# Patient Record
Sex: FEMALE | Race: WHITE
[De-identification: names, ages, dates, MRNs, and addresses within clinical notes are randomized per-mention and may not be internally consistent; named-entity substitution may affect disease eponyms.]

---

## 2021-04-01 ENCOUNTER — HOSPITAL ENCOUNTER (INPATIENT)
Dept: HOSPITAL 41 - JD.OB | Age: 31
LOS: 2 days | Discharge: HOME | DRG: 560 | End: 2021-04-03
Attending: OBSTETRICS & GYNECOLOGY | Admitting: OBSTETRICS & GYNECOLOGY
Payer: COMMERCIAL

## 2021-04-01 DIAGNOSIS — D62: ICD-10-CM

## 2021-04-01 DIAGNOSIS — Z20.822: ICD-10-CM

## 2021-04-01 DIAGNOSIS — Z3A.39: ICD-10-CM

## 2021-04-01 DIAGNOSIS — N87.1: ICD-10-CM

## 2021-04-01 PROCEDURE — 00HU33Z INSERTION OF INFUSION DEVICE INTO SPINAL CANAL, PERCUTANEOUS APPROACH: ICD-10-PCS | Performed by: OBSTETRICS & GYNECOLOGY

## 2021-04-01 PROCEDURE — 3E0R3BZ INTRODUCTION OF ANESTHETIC AGENT INTO SPINAL CANAL, PERCUTANEOUS APPROACH: ICD-10-PCS | Performed by: OBSTETRICS & GYNECOLOGY

## 2021-04-01 PROCEDURE — 10907ZC DRAINAGE OF AMNIOTIC FLUID, THERAPEUTIC FROM PRODUCTS OF CONCEPTION, VIA NATURAL OR ARTIFICIAL OPENING: ICD-10-PCS | Performed by: OBSTETRICS & GYNECOLOGY

## 2021-04-01 PROCEDURE — U0002 COVID-19 LAB TEST NON-CDC: HCPCS

## 2021-04-01 RX ADMIN — EPHEDRINE SULFATE PRN MG: 50 INJECTION, SOLUTION INTRAVENOUS at 13:03

## 2021-04-01 RX ADMIN — EPHEDRINE SULFATE PRN MG: 50 INJECTION, SOLUTION INTRAVENOUS at 12:47

## 2021-04-01 NOTE — PCM.LDHP
L&D History of Present Illness





- General


Date of Service: 21


Admit Problem/Dx: 


                   Patient Status Order with Admit Dx/Problem





21 08:55


Patient Status [ADT] Routine 








                           Admission Diagnosis/Problem











Admission Diagnosis/Problem    39 weeks gestation of pregnancy














Source of Information: Patient


History Limitations: Reports: No Limitations





- History of Present Illness


Introduction:: 





Arlene Salinas is a 30-year-old -0-3-4 female at 39 weeks 2 days (MARISEL 

2021) by an 11-week ultrasound who presents for an elective induction of 

labor.  She reports that she has been having irregular contractions since her 

last visit but nothing that has been very painful or strong.  She denies any 

leaking of fluid or vaginal bleeding.  Reports good fetal movement.


Timing/Duration: Reports: intermittent


Location, Pregnancy: Reports: Pelvic


Quality: Reports: Pressure


Severity: Mild


Improves with: Reports: None


Worsens with: Reports: None


Associated Symptoms: Denies: vaginal bleeding, vaginal discharge, vaginal fluid


Present Illness Comments:: 





Arlene Salinas is a 30-year-old -0-3-4 female at 39 weeks 2 days (MARISEL 

2021) by an 11-week ultrasound who presents for an elective induction of 

labor.  She has had overall routine care throughout the pregnancy with myself, 

Dr. Huitron, and with Dr. Harrison.  Her pregnancy has been complicated by abnormal Pap

 smear and positive chlamydia infection.  The chlamydia infection was diagnosed 

on 9/15/2020 and she was treated with azithromycin on 2020.  She had a 

negative test of cure at 36 weeks gestational age.  She had colposcopy done for 

HGSIL Pap smear that showed VIJAY-2-3 as well as VIJAY-1.  ECC was not performed.  

Patient is planning to have LEEP after delivery.  She had repeat colposcopy 

without biopsies in late pregnancy that did not show any progression of the 

cervical abnormalities.  She had an elevated 1 hour glucose tolerance test with 

a value of 137 but did not get a 3-hour glucose tolerance test done.  She does 

have history of Graves' disease but her thyroid levels have been normal 

throughout the pregnancy.  Her GBS swab was negative.  Her fetal anatomy ultra

sound was normal and did not show any abnormalities.





Her pregnancy is complicated by:


* VIJAY-2-3 on biopsy of her cervix that was done on colposcopy on 10/19/2020.  

  She had repeat colposcopy towards the end of the pregnancy that did not show 

  any significant changes in the cervix.  Plan for LEEP procedure after delivery


* Chlamydia infection during pregnancy with treatment.  She had negative test of

   cure on 10/19/2020 with negative test at 36 weeks gestational age.


* Elevated 1 hour glucose tolerance test at 33 weeks gestational age with a 

  value of 137.  She did not have 3-hour glucose tolerance test done.


* History of MRSA staph infection in  with negative MRSA culture prior to 

  delivery.


* History of Graves' disease with normal thyroid levels during pregnancy








OB/GYN history


-0-3-4


G1: 3/11/2009, 38 weeks, , female infant, 7 pounds 3 ounces, epidural for 

anesthesia, no complications


G2: 2010, 4 weeks, miscarriage


G3: 2012, 38 weeks, , male infant, 7 pounds 5 ounces, epidural for 

anesthesia, uterine atony after delivery and treated with medications


G4: 2013, 11 weeks, miscarriage


G5: 11/3/2014, 39 weeks, , female infant, 6 pounds 7 ounces, epidural for 

anesthesia, contractions starting at 6 months


G6: 10/22/2015, 5 weeks, miscarriage


G7: 10/11/2016, 39 weeks 1 day, , female infant, 6 pounds 14 ounces, 

epidural for anesthesia, no complications


G8: Current pregnancy








Prenatal labs


Blood type: O+


Antibody screen: Negative


First trimester hematocrit/hemoglobin: 40.5%/13.4 on 9/15/2020


Platelets: 284 on 9/15/2020


Urine culture: Mixed morro suggestive of contamination


Rubella status: Immune


Hepatitis B surface antigen: Negative


RPR: Negative


HIV: Negative


Gonorrhea: Negative


Chlamydia: Positive, treated with azithromycin


Anatomy ultrasound: Normal anatomy, anterior placenta


Repeat gonorrhea: Negative on 10/19/2020


Repeat Chlamydia: Negative on 10/19/2020


One hour glucose tolerance test: 137 at 33 weeks gestational age


Third trimester hematocrit/hemoglobin: 35.3%/11.4 on 2021


Platelets: 320 on 2021


GBS status: Negative





- Related Data


Allergies/Adverse Reactions: 


                                    Allergies











Allergy/AdvReac Type Severity Reaction Status Date / Time


 


lidocaine Allergy  Rash Verified 21 09:06


 


penicillin Allergy  Hives Verified 21 09:06











Home Medications: 


                                    Home Meds





Propranolol [Inderal] 20 mg PO TID PRN 10/01/15 [History]


Acetaminophen [Tylenol] 650 mg PO Q6H PRN #0 tablet 10/13/16 [Rx]


Benzocaine/Menthol [Dermoplast Pain Relief Spray] 1 spray TOP ASDIRECTED PRN #0 

canister 10/13/16 [Rx]


Docusate Sodium [Colace] 100 mg PO BID PRN #0 cap 10/13/16 [Rx]


Ibuprofen [IJD: Ibuprofen] 200 - 600 mg PO Q6H PRN #0 tablet 10/13/16 [Rx]


witch hazeL [Tucks] 1 pad TOP ASDIRECTED PRN #0 pad 10/13/16 [Rx]











Past Medical History


Cardiovascular History: Reports: Other (See Below)


Other Cardiovascular History: Tachycardia at times


OB/GYN History: Reports: Pregnancy, Spontaneous , Other (See Below)


: 8


Para: 4


Other OB/BYN History: D&C 


Endocrine/Metabolic History: Reports: Other (See Below)


Other Endocrine/Metabolic History: Graves Disease


Other Oncologic History: cancerous mole removed





- Past Surgical History


Cardiovascular Surgical History: Reports: None


GI Surgical History: Reports: None


Female  Surgical History: Reports: None


Dermatological Surgical History: Reports: Other (See Below)





Social & Family History





- Family History


Family Medical History: No Pertinent Family History





- Tobacco Use


Tobacco Use Status *Q: Never Tobacco User


Tobacco Use Within Last Twelve Months: No





- Tobacco Core Measures


Tobacco Use/Smoking Within Last 30 Days: No


Smokeless Tobacco Use in Last 30 Days: No





- Caffeine Use


Caffeine Use: Reports: None





- Alcohol Use


Alcohol Use History: No





- Recreational Drug Use


Recreational Drug Use: No


Drug Use in Last 12 Months: No





- Living Situation & Occupation


Living situation: Reports: , with Spouse, with Family





H&P Review of Systems





- Review of Systems:


Review Of Systems: See Below


General: Denies: Fever, Chills, Malaise, Fatigue


HEENT: Denies: Headaches, Rhinitis, Post Nasal Drip, Sinus Congestion, Sore 

Throat, Visual Changes


Pulmonary: Denies: Shortness of Breath, Wheezing, Pleuritic Chest Pain


Cardiovascular: Denies: Chest Pain, Palpitations, Dyspnea on Exertion


Gastrointestinal: Reports: Diarrhea (one episode earlier this week).  Denies: 

Abdominal Pain, Constipation, Nausea, Vomiting


Genitourinary: Denies: Dysuria, Frequency, Burning, Pain, Urgency


Musculoskeletal: Reports: Back Pain (and hip pain of pregnancy)


Skin: Denies: Rash, Lesions


Psychiatric: Denies: Depression, Anxiety


Neurological: Denies: Dizziness, Headache





L&D Exam





- Exam


Exam: See Below





- Vital Signs


Weight: 84.368 kg





- OB Specific


Contraction Duration (sec): 30-45


Contraction Frequency (min): 3-4


Contraction Intensity: Mild


Fetal Movement: Active


Fetal Heart Tones: Present


Fetal Heart Tones per Min: 125 (+15 x 15 accelerations, no decelerations)


Fetal Heart Rate (FHR) Variability: Moderate (6-25 bmp)


Birth Presentation: Vertex


Estimated Fetal Weight: 7-7.5 pounds by Leopold





- Fleming Score


Fleming Score Cervix Position: Midposition


Fleming Score Consistency: Soft


Fleming Score Effacement: 31-50% (40)


Fleming Score Dilation: 1-2 cm (1 cm)


Fleming Score Infant's Station: -3 (-4)


Fleming Score Total: 5





- Exam


General: Alert, Oriented


HEENT: Conjunctiva Clear, EOMI


Neck: Supple, Trachea Midline


Lungs: Clear to Auscultation, Normal Respiratory Effort


Cardiovascular: Regular Rate, Regular Rhythm


GI/Abdominal Exam: Soft, Non-Tender, No Distention, Other (Gravid).  No: 

Guarding, Rigid, Rebound


Genitourinary: Normal external exam, Cervical dilitation (1 cm)


Extremities: Normal Inspection, Pedal Edema (1+)


Skin: Warm, Dry, Intact


Psychiatric: Alert, Normal Affect, Normal Mood





- Problem List


(1) Chlamydia infection affecting pregnancy in first trimester


SNOMED Code(s): 72380348, 399995726


   ICD Code: O98.811 - OTH MATERNAL INFEC/PARASTC DISEASES COMP PREG, FIRST TRI;

 A74.9 - CHLAMYDIAL INFECTION, UNSPECIFIED   Status: Acute   Current Visit: Yes 

  





(2) VIJAY II (cervical intraepithelial neoplasia II)


SNOMED Code(s): 995897127


   ICD Code: N87.1 - MODERATE CERVICAL DYSPLASIA   Status: Acute   Current 

Visit: Yes   





(3) VIJAY III (cervical intraepithelial neoplasia III)


Status: Acute   Current Visit: Yes   





(4) Abnormal glucose tolerance test (GTT) during pregnancy, antepartum


Status: Acute   Current Visit: Yes   





(5) History of MRSA infection


SNOMED Code(s): 776757362, [id number]


   ICD Code: Z86.14 - PERSONAL HISTORY OF METHICILLIN RESIS STAPH INFECTION   

Status: Acute   Current Visit: Yes   





(6) 39 weeks gestation of pregnancy


SNOMED Code(s): 70750492


   ICD Code: Z3A.39 - 39 WEEKS GESTATION OF PREGNANCY   Status: Acute   Current 

Visit: No   


Problem List Initiated/Reviewed/Updated: Yes


Orders Last 24hrs: 


                               Active Orders 24 hr











 Category Date Time Status


 


 Patient Status [ADT] Routine ADT  21 08:55 Ordered


 


 Activity as Tolerated [RC] PFP Care  21 08:54 Ordered


 


 Communication Order [RC] ASDIRECTED Care  21 08:54 Ordered


 


 Fetal Heart Tones [RC] ASDIRECTED Care  21 08:55 Ordered


 


 Fetal Non Stress Test [RC] PER UNIT ROUTINE Care  21 08:54 Ordered


 


 Notify Provider Vital Signs [RC] PRN Care  21 08:56 Ordered


 


 Notify Provider [RC] PFP Care  21 08:54 Ordered


 


 Notify Provider [RC] PRN Care  21 08:54 Ordered


 


 Peripheral IV Care [RC] .AS DIRECTED Care  21 08:55 Ordered


 


 Pump Management, Intrathecal [RC] ASDIRECTED Care  21 08:56 Ordered


 


 Urinary Catheter Assessment [RC] ASDIRECTED Care  21 08:54 Ordered


 


 Vital Signs [RC] PER UNIT ROUTINE Care  21 08:54 Ordered


 


 Regular Diet [DIET] Diet  21 Breakfast Ordered


 


 CBC WITH AUTO DIFF [HEME] Routine Lab  21 08:54 Ordered


 


 CORONAVIRUS COVID-19 CHELO [MOLEC] Stat Lab  21 07:21 Received


 


 RAPID PLASMA REAGIN,RPR [CHEM] Routine Lab  21 08:54 Ordered


 


 TYPE AND SCREEN [BBK] Routine Lab  21 08:59 Ordered


 


 Lactated Ringers [Ringers, Lactated] 1,000 ml Med  21 09:00 Ordered





 IV ASDIRECTED   


 


 Nalbuphine [Nubain] Med  21 08:54 Ordered





 10 mg IVPUSH Q2H PRN   


 


 Oxytocin/Lactated Ringers [Pitocin in LR 10 Units/1,000 Med  21 09:00 

Ordered





 ML]   





 10 unit in 1,000 ml IV TITRATE   


 


 Oxytocin/Lactated Ringers [Pitocin in LR 20 Units/1,000 Med  21 09:00 

Ordered





 ML]   





 20 unit in 1,000 ml IV .CONTINUOUS   


 


 Sodium Chloride 0.9% [Saline Flush] Med  21 08:54 Ordered





 10 ml FLUSH ASDIRECTED PRN   


 


 miSOPROStoL [Cytotec] Med  21 08:57 Once





 1,000 mcg RECTAL ONETIME ONE   


 


 Electronic Fetal Heart Tones Ext w TOCO [WOMSER] Ot  21 08:54 Ordered





 Routine   


 


 Electronic Fetal Heart Tones Internal [WOMSER] Per Unit Ot  21 08:54 

Ordered





 Routine   


 


 Peripheral IV Insertion Adult [OM.PC] Routine Ot  21 08:54 Ordered


 


 Resuscitation Status Routine Resus Stat  21 08:54 Ordered








                                Medication Orders





Lactated Ringer's (Ringers, Lactated)  1,000 mls @ 100 mls/hr IV ASDIRECTED IBRAHIMA


Oxytocin/Lactated Ringer's (Pitocin In Lr 10 Units/1,000 Ml)  10 unit in 1,000 

mls @ 12 mls/hr IV TITRATE IBRAHIMA; Protocol


Oxytocin/Lactated Ringer's (Pitocin In Lr 20 Units/1,000 Ml)  20 unit in 1,000 

mls @ 100 mls/hr IV .CONTINUOUS IBRAHIMA


Misoprostol (Misoprostol 200 Mcg Tab)  1,000 mcg RECTAL ONETIME ONE


   Stop: 21 08:58


Nalbuphine HCl (Nalbuphine 10 Mg/1 Ml Vial)  10 mg IVPUSH Q2H PRN


   PRN Reason: Pain


Sodium Chloride (Sodium Chloride 0.9% 10 Ml Syringe)  10 ml FLUSH ASDIRECTED PRN


   PRN Reason: Keep Vein Open








Assessment/Plan Comment:: 





Arlene Salinas is a 30-year-old -0-3-4 female at 39 weeks 2 days (MARISEL 

2021) by an 11-week ultrasound who presents for an elective induction of 

labor.





Refer to observation for elective induction of labor


Start Pitocin for induction of labor


Continuous monitoring


Place IV and have Lactated Ringer's at 125 ml/hr 


May have small amounts of regular diet


Activity as tolerated


May have epidural as desired


Plans to breast-feed after delivery


Plan for artificial rupture membranes once cervix is more dilated


Anticipate vaginal delivery unless otherwise indicated





Juarez Huitron MD


10:52 AM


2021

## 2021-04-01 NOTE — PCM.PNLD
<Gwendolyn Luna - Last Filed: 04/01/21 14:28>





Labor Progress Note





- VS & Meds


Vital Signs: 


                                Last Vital Signs











Temp  99.2 F   04/01/21 08:54


 


Pulse  91   04/01/21 08:54


 


Resp  16   04/01/21 08:54


 


BP  118/78   04/01/21 08:54


 


Pulse Ox      











Active Medications: 


                               Current Medications





Diphenhydramine HCl (Diphenhydramine 50 Mg/Ml Sdv)  25 mg IVPUSH Q6H PRN


   PRN Reason: pruritis


Ephedrine Sulfate (Ephedrine 50 Mg/Ml Sdv)  5 mg IVPUSH ASDIRECTED PRN


   PRN Reason: Hypotension


   Last Admin: 04/01/21 13:03 Dose:  5 mg


   Documented by: 


Fentanyl (Fentanyl 100 Mcg/2 Ml Sdv)  100 mcg EPIDUR Q3H PRN


   PRN Reason: Pain


   Last Admin: 04/01/21 11:49 Dose:  100 mcg


   Documented by: 


Fentanyl/Bupivacaine HCl (Bupivacaine/Fentanyl/Ns 100 Ml Bag)  100 ml EPIDUR 

ASDIRECTED PRN


   PRN Reason: Pain


   Last Admin: 04/01/21 12:14 Dose:  100 ml


   Documented by: 


Lactated Ringer's (Ringers, Lactated)  1,000 mls @ 100 mls/hr IV ASDIRECTED IBRAHIMA


   Last Admin: 04/01/21 12:19 Dose:  100 mls/hr


   Documented by: 


Oxytocin/Lactated Ringer's (Pitocin In Lr 10 Units/1,000 Ml)  10 unit in 1,000 m

ls @ 12 mls/hr IV TITRATE IBRAHIMA; Protocol


   Last Titration: 04/01/21 14:01 Dose:  16 munits/min, 96 mls/hr


   Documented by: 


Oxytocin/Lactated Ringer's (Pitocin In Lr 20 Units/1,000 Ml)  20 unit in 1,000 

mls @ 100 mls/hr IV .CONTINUOUS IBRAHIMA


Nalbuphine HCl (Nalbuphine 10 Mg/1 Ml Vial)  10 mg IVPUSH Q2H PRN


   PRN Reason: Pain


Sodium Chloride (Sodium Chloride 0.9% 10 Ml Syringe)  10 ml FLUSH ASDIRECTED PRN


   PRN Reason: Keep Vein Open





Discontinued Medications





Fentanyl (Fentanyl 100 Mcg/2 Ml Sdv) Confirm Administered Dose 100 mcg .ROUTE 

.STK-MED ONE


   Stop: 04/01/21 11:21


Misoprostol (Misoprostol 200 Mcg Tab)  1,000 mcg RECTAL ONETIME ONE


   Stop: 04/01/21 08:58











- Uterine Contractions


Uterine Monitoring Mode: External [place]


Contraction Frequency (min): 2-3 minutes


Contraction Duration (sec): 65-70


Contraction Intensity: Mild





- Fetal Monitoring


Fetal Monitor Mode: Doppler/Auscultation


Fetal Heart Rate (FHR) Baseline: 130


Fetal Heart Rate (FHR) Variability: Moderate (6-25 bmp)


Fetal Accelerations: Present, 15x15


Fetal Decelerations: Late, Intermittent (<50% x 20 min) (two late declerations 

after AROM )


Fetal Strip Review: Category II





- Vaginal Exam


Dilation (cm): 4 cm 


Effacement (Percent): 50%


Station: -2


Cervical Position: Midposition


Sterile Vaginal Exam Performed By: Juarez Huitron (Gwendolyn Luna)





- Labor Progress (Free Text)


Labor Progress: 





AROM was performed at approximately 1415 with moderate amount of clear fluid. 

Mother and baby tolerated the procedure well. 





<Juarez Huitron - Last Filed: 04/01/21 15:37>





Labor Progress Note





- VS & Meds


Vital Signs: 


                                Last Vital Signs











Temp  37.3 C   04/01/21 08:54


 


Pulse  91   04/01/21 08:54


 


Resp  16   04/01/21 08:54


 


BP  118/78   04/01/21 08:54


 


Pulse Ox      











Active Medications: 


                               Current Medications





Diphenhydramine HCl (Diphenhydramine 50 Mg/Ml Sdv)  25 mg IVPUSH Q6H PRN


   PRN Reason: pruritis


Ephedrine Sulfate (Ephedrine 50 Mg/Ml Sdv)  5 mg IVPUSH ASDIRECTED PRN


   PRN Reason: Hypotension


   Last Admin: 04/01/21 13:03 Dose:  5 mg


   Documented by: 


Fentanyl (Fentanyl 100 Mcg/2 Ml Sdv)  100 mcg EPIDUR Q3H PRN


   PRN Reason: Pain


   Last Admin: 04/01/21 11:49 Dose:  100 mcg


   Documented by: 


Fentanyl/Bupivacaine HCl (Bupivacaine/Fentanyl/Ns 100 Ml Bag)  100 ml EPIDUR 

ASDIRECTED PRN


   PRN Reason: Pain


   Last Admin: 04/01/21 12:14 Dose:  100 ml


   Documented by: 


Lactated Ringer's (Ringers, Lactated)  1,000 mls @ 100 mls/hr IV ASDIRECTED IBRAHIMA


   Last Admin: 04/01/21 12:19 Dose:  100 mls/hr


   Documented by: 


Oxytocin/Lactated Ringer's (Pitocin In Lr 10 Units/1,000 Ml)  10 unit in 1,000 

mls @ 12 mls/hr IV TITRATE IBRAHIMA; Protocol


   Last Titration: 04/01/21 14:01 Dose:  16 munits/min, 96 mls/hr


   Documented by: 


Oxytocin/Lactated Ringer's (Pitocin In Lr 20 Units/1,000 Ml)  20 unit in 1,000 

mls @ 100 mls/hr IV .CONTINUOUS IBRAHIMA


Nalbuphine HCl (Nalbuphine 10 Mg/1 Ml Vial)  10 mg IVPUSH Q2H PRN


   PRN Reason: Pain


Sodium Chloride (Sodium Chloride 0.9% 10 Ml Syringe)  10 ml FLUSH ASDIRECTED PRN


   PRN Reason: Keep Vein Open





Discontinued Medications





Fentanyl (Fentanyl 100 Mcg/2 Ml Sdv) Confirm Administered Dose 100 mcg .ROUTE 

.STK-MED ONE


   Stop: 04/01/21 11:21


   Last Admin: 04/01/21 15:24 Dose:  Not Given


   Documented by: 


Misoprostol (Misoprostol 200 Mcg Tab)  1,000 mcg RECTAL ONETIME ONE


   Stop: 04/01/21 08:58











- Labor Progress (Free Text)


Labor Progress: 





I have seen and evaluated the patient with the medical student and agree with 

the assessment as above.  We will continue with Pitocin for induction of labor 

and anticipate vaginal delivery unless otherwise indicated.








Juarez Huitron MD


3:37 PM


4/1/2021

## 2021-04-01 NOTE — PCM.DEL
<Gwendolyn Luna - Last Filed: 21 18:26>





L & D Note





- General Info


Date of Service: 21


Mother's Due Date: 21





- Delivery Note


Labor: Spontaneous, Augmented by ARM


Delivery Outcome: Livebirth


Infant Delivery Method: Spontaneous Vaginal Delivery-Single


Infant Delivery Mode: Spontaneous


Birth Presentation: Left Occiput Anterior ([place])


Nuchal Cord: None


Anesthesia Type: Epidural


Amniotic Fluid Description: Clear


Episiotomy Type: None


Laceration: None


Placenta: Intact, Spontaneous


Cord: 3 Vessels


Estimated Blood Loss: 1,000


Resuscitation Needed: No


: Bulb Syringe, Stimulated, Warmed


 Provider: Feroz Ware


APGAR Score 1 min: 8


APGAR Score 5 min: 9


Second Stage Interventions: Reports: Encouragement Given


Delivery Comments (Free Text/Narrative):: 





Stage I: Arlene Salinas is a 30-year-old -0-3-4 female at 39 weeks 2 days

(MARISEL 2021) by an 11-week ultrasound who presents for an elective induction 

of labor. Pitocin was given, an epidural was placed and AROM was performed with 

clear fluid.  The patient progressed to complete and pushing. 





Stage II:  of a live male infant weighing 3040 g (6 lbs 11.2 ounces) at 1744

on 21 in the [place] with APGARs of 8/9. No nuchal cord present. The infant 

was placed on the mother's abdomen and was warmed and stimulated. The cord was 

clamped and cut by the infant's father after a period of delayed cord clamping. 





Stage III: Placenta was delivered intact, spontaneous. Noted three vessel cord. 

The vaginal mucosa was inspected and no lacerations were seen. EBL of 1000 mL. 

Rectal cytotec 1000 micrograms was placed rectally, and Pitocin was started. 

Fundal massage demonstrated a firm uterus without active bleeding. Mother and 

infant stable to recovery.





- General Info


Date of Service: 21





- Patient Data


Vitals - Most Recent: 


                                Last Vital Signs











Temp  99.2 F   21 08:54


 


Pulse  91   21 08:54


 


Resp  16   21 08:54


 


BP  118/78   21 08:54


 


Pulse Ox      











Weight - Most Recent: 84.368 kg


I&O - Last 24 Hours: 


                                 Intake & Output











 21





 06:59 14:59 22:59


 


Intake Total   0


 


Balance   0











Lab Results Last 24 Hours: 


                         Laboratory Results - last 24 hr











  21 Range/Units





  07:21 09:11 09:11 


 


WBC   8.62   (3.98-10.04)  K/mm3


 


RBC   3.77 L   (3.98-5.22)  M/mm3


 


Hgb   10.5 L   (11.2-15.7)  gm/dl


 


Hct   33.2 L   (34.1-44.9)  %


 


MCV   88.1   (79.4-94.8)  fl


 


MCH   27.9   (25.6-32.2)  pg


 


MCHC   31.6 L   (32.2-35.5)  g/dl


 


RDW Std Deviation   45.2   (36.4-46.3)  fL


 


Plt Count   252   (182-369)  K/mm3


 


MPV   10.0   (9.4-12.3)  fl


 


Neut % (Auto)   75.7 H   (34.0-71.1)  %


 


Lymph % (Auto)   15.1 L   (19.3-51.7)  %


 


Mono % (Auto)   8.2   (4.7-12.5)  %


 


Eos % (Auto)   0.2 L   (0.7-5.8)  


 


Baso % (Auto)   0.2   (0.1-1.2)  %


 


Neut # (Auto)   6.52 H   (1.56-6.13)  K/mm3


 


Lymph # (Auto)   1.30   (1.18-3.74)  K/mm3


 


Mono # (Auto)   0.71 H   (0.24-0.36)  K/mm3


 


Eos # (Auto)   0.02 L   (0.04-0.36)  K/mm3


 


Baso # (Auto)   0.02   (0.01-0.08)  K/mm3


 


POC Glucose     ()  mg/dL


 


SARS-CoV-2 RNA (CHELO)  Negative    (NEGATIVE)  


 


Blood Type    O POSITIVE  


 


Gel Antibody Screen    Negative  














  21 Range/Units





  09:23 13:09 17:12 


 


WBC     (3.98-10.04)  K/mm3


 


RBC     (3.98-5.22)  M/mm3


 


Hgb     (11.2-15.7)  gm/dl


 


Hct     (34.1-44.9)  %


 


MCV     (79.4-94.8)  fl


 


MCH     (25.6-32.2)  pg


 


MCHC     (32.2-35.5)  g/dl


 


RDW Std Deviation     (36.4-46.3)  fL


 


Plt Count     (182-369)  K/mm3


 


MPV     (9.4-12.3)  fl


 


Neut % (Auto)     (34.0-71.1)  %


 


Lymph % (Auto)     (19.3-51.7)  %


 


Mono % (Auto)     (4.7-12.5)  %


 


Eos % (Auto)     (0.7-5.8)  


 


Baso % (Auto)     (0.1-1.2)  %


 


Neut # (Auto)     (1.56-6.13)  K/mm3


 


Lymph # (Auto)     (1.18-3.74)  K/mm3


 


Mono # (Auto)     (0.24-0.36)  K/mm3


 


Eos # (Auto)     (0.04-0.36)  K/mm3


 


Baso # (Auto)     (0.01-0.08)  K/mm3


 


POC Glucose  79  71  89  ()  mg/dL


 


SARS-CoV-2 RNA (CHELO)     (NEGATIVE)  


 


Blood Type     


 


Gel Antibody Screen     











Med Orders - Current: 


                               Current Medications





Diphenhydramine HCl (Diphenhydramine 50 Mg/Ml Sdv)  25 mg IVPUSH Q6H PRN


   PRN Reason: pruritis


Ephedrine Sulfate (Ephedrine 50 Mg/Ml Sdv)  5 mg IVPUSH ASDIRECTED PRN


   PRN Reason: Hypotension


   Last Admin: 21 13:03 Dose:  5 mg


   Documented by: 


Fentanyl (Fentanyl 100 Mcg/2 Ml Sdv)  100 mcg EPIDUR Q3H PRN


   PRN Reason: Pain


   Last Admin: 21 11:49 Dose:  100 mcg


   Documented by: 


Fentanyl/Bupivacaine HCl (Bupivacaine/Fentanyl/Ns 100 Ml Bag)  100 ml EPIDUR 

ASDIRECTED PRN


   PRN Reason: Pain


   Last Admin: 21 12:14 Dose:  100 ml


   Documented by: 


Lactated Ringer's (Ringers, Lactated)  1,000 mls @ 100 mls/hr IV ASDIRECTED IBRAHIMA


   Last Admin: 21 12:19 Dose:  100 mls/hr


   Documented by: 


Oxytocin/Lactated Ringer's (Pitocin In Lr 10 Units/1,000 Ml)  10 unit in 1,000 

mls @ 12 mls/hr IV TITRATE IBRAHIMA; Protocol


   Last Titration: 21 14:01 Dose:  16 munits/min, 96 mls/hr


   Documented by: 


Oxytocin/Lactated Ringer's (Pitocin In Lr 20 Units/1,000 Ml)  20 unit in 1,000 

mls @ 100 mls/hr IV .CONTINUOUS IBRAHIMA


Nalbuphine HCl (Nalbuphine 10 Mg/1 Ml Vial)  10 mg IVPUSH Q2H PRN


   PRN Reason: Pain


Sodium Chloride (Sodium Chloride 0.9% 10 Ml Syringe)  10 ml FLUSH ASDIRECTED PRN


   PRN Reason: Keep Vein Open





Discontinued Medications





Fentanyl (Fentanyl 100 Mcg/2 Ml Sdv) Confirm Administered Dose 100 mcg .ROUTE 

.STK-MED ONE


   Stop: 21 11:21


   Last Admin: 21 15:24 Dose:  Not Given


   Documented by: 


Misoprostol (Misoprostol 200 Mcg Tab)  1,000 mcg RECTAL ONETIME ONE


   Stop: 21 08:58


   Last Admin: 21 17:52 Dose:  1,000 mcg


   Documented by: 











- Problem List & Annotations


(1) Vaginal delivery


SNOMED Code(s): 935819021


   Code(s): O80 - ENCOUNTER FOR FULL-TERM UNCOMPLICATED DELIVERY   Status: Acute

  Current Visit: Yes   





(2) 39 weeks gestation of pregnancy


SNOMED Code(s): 30817628


   Code(s): Z3A.39 - 39 WEEKS GESTATION OF PREGNANCY   Status: Acute   Current 

Visit: No   





(3) Postpartum hemorrhage


SNOMED Code(s): 47784811


   Code(s): O72.1 - OTHER IMMEDIATE POSTPARTUM HEMORRHAGE   Status: Acute   

Current Visit: Yes   





- Problem List Review


Problem List Initiated/Reviewed/Updated: Yes





- Assessment


Assessment:: 





 of a live male infant weighing 3040 g (6 lbs 11.2 ounces) at 1744 on 

21 in the [facility] with APGARs of 8/9 to a 30-year-old -0-3-4 female at 39

weeks 2 days (MARISEL 2021) by an 11-week ultrasound who presented for an 

elective induction of labor. 





- Plan


Plan:: 





Arlene Salinas is a 30-year-old -0-3-4 female at 39 weeks 2 days (MARISEL 

2021) by an 11-week ultrasound who presents for an elective induction of 

labor.





PPD#0  of a live male infant weighing 3040 g (6 lbs 11.2 ounces) at 1744 on 

21 in the [place] with APGARs of 8/9.


Rectal Cytotec 1000 micrograms placed for concerns of postpartum hemorrhage


Postpartum care per unit routine 


Regular diet


Activity as tolerated


Plans to breast-feed 


Anticipate discharge in 24-48 hours pending pediatrician's recommendation











<Juarez Huitron - Last Filed: 21 18:41>





- Patient Data


Vitals - Most Recent: 


                                Last Vital Signs











Temp  37.3 C   21 08:54


 


Pulse  91   21 08:54


 


Resp  16   21 08:54


 


BP  118/78   21 08:54


 


Pulse Ox      











I&O - Last 24 Hours: 


                                 Intake & Output











 21





 06:59 14:59 22:59


 


Intake Total   0


 


Balance   0











Lab Results Last 24 Hours: 


                         Laboratory Results - last 24 hr











  21 Range/Units





  07:21 09:11 09:11 


 


WBC   8.62   (3.98-10.04)  K/mm3


 


RBC   3.77 L   (3.98-5.22)  M/mm3


 


Hgb   10.5 L   (11.2-15.7)  gm/dl


 


Hct   33.2 L   (34.1-44.9)  %


 


MCV   88.1   (79.4-94.8)  fl


 


MCH   27.9   (25.6-32.2)  pg


 


MCHC   31.6 L   (32.2-35.5)  g/dl


 


RDW Std Deviation   45.2   (36.4-46.3)  fL


 


Plt Count   252   (182-369)  K/mm3


 


MPV   10.0   (9.4-12.3)  fl


 


Neut % (Auto)   75.7 H   (34.0-71.1)  %


 


Lymph % (Auto)   15.1 L   (19.3-51.7)  %


 


Mono % (Auto)   8.2   (4.7-12.5)  %


 


Eos % (Auto)   0.2 L   (0.7-5.8)  


 


Baso % (Auto)   0.2   (0.1-1.2)  %


 


Neut # (Auto)   6.52 H   (1.56-6.13)  K/mm3


 


Lymph # (Auto)   1.30   (1.18-3.74)  K/mm3


 


Mono # (Auto)   0.71 H   (0.24-0.36)  K/mm3


 


Eos # (Auto)   0.02 L   (0.04-0.36)  K/mm3


 


Baso # (Auto)   0.02   (0.01-0.08)  K/mm3


 


POC Glucose     ()  mg/dL


 


SARS-CoV-2 RNA (CHELO)  Negative    (NEGATIVE)  


 


Blood Type    O POSITIVE  


 


Gel Antibody Screen    Negative  














  21 Range/Units





  09:23 13:09 17:12 


 


WBC     (3.98-10.04)  K/mm3


 


RBC     (3.98-5.22)  M/mm3


 


Hgb     (11.2-15.7)  gm/dl


 


Hct     (34.1-44.9)  %


 


MCV     (79.4-94.8)  fl


 


MCH     (25.6-32.2)  pg


 


MCHC     (32.2-35.5)  g/dl


 


RDW Std Deviation     (36.4-46.3)  fL


 


Plt Count     (182-369)  K/mm3


 


MPV     (9.4-12.3)  fl


 


Neut % (Auto)     (34.0-71.1)  %


 


Lymph % (Auto)     (19.3-51.7)  %


 


Mono % (Auto)     (4.7-12.5)  %


 


Eos % (Auto)     (0.7-5.8)  


 


Baso % (Auto)     (0.1-1.2)  %


 


Neut # (Auto)     (1.56-6.13)  K/mm3


 


Lymph # (Auto)     (1.18-3.74)  K/mm3


 


Mono # (Auto)     (0.24-0.36)  K/mm3


 


Eos # (Auto)     (0.04-0.36)  K/mm3


 


Baso # (Auto)     (0.01-0.08)  K/mm3


 


POC Glucose  79  71  89  ()  mg/dL


 


SARS-CoV-2 RNA (CHELO)     (NEGATIVE)  


 


Blood Type     


 


Gel Antibody Screen     











Med Orders - Current: 


                               Current Medications





Acetaminophen (Acetaminophen 325 Mg Tab)  650 mg PO Q6H PRN


   PRN Reason: mild pain or fever


Benzocaine/Menthol (Benzocaine/Menthol 20%-0.5% Spray 56 Gm Canister)  0 gm TOP 

ASDIRECTED PRN


   PRN Reason: Perineal Comfort Measure


Docusate Sodium (Docusate Sodium 100 Mg Cap)  100 mg PO BID PRN


   PRN Reason: Constipation


Hydrocortisone Acetate (Hydrocortisone Acetate 25 Mg Supp)  25 mg RECTAL BID PRN


   PRN Reason: Hemorrhoid pain


Oxytocin/Lactated Ringer's (Pitocin In Lr 20 Units/1,000 Ml)  20 unit in 1,000 

mls @ 500 mls/hr IV SEECOMMENT IBRAHIMA


Ibuprofen (Ibuprofen 600 Mg Tab)  600 mg PO Q6H PRN


   PRN Reason: Mild pain or fever


Prenat Multivit/Noxapater/Iron/Folic Ac (Prenatal Multivitamin With Calcium/Folic 

Acid/Iron Tab)  1 each PO DAILY IBRAHIMA


Witch Hazel (Witch Hazel Medicated Pads 40/Jar)  1 pad TOP ASDIRECTED PRN


   PRN Reason: Perineal Comfort Measure





Discontinued Medications





Diphenhydramine HCl (Diphenhydramine 50 Mg/Ml Sdv)  25 mg IVPUSH Q6H PRN


   PRN Reason: pruritis


Ephedrine Sulfate (Ephedrine 50 Mg/Ml Sdv)  5 mg IVPUSH ASDIRECTED PRN


   PRN Reason: Hypotension


   Last Admin: 21 13:03 Dose:  5 mg


   Documented by: 


Fentanyl (Fentanyl 100 Mcg/2 Ml Sdv) Confirm Administered Dose 100 mcg .ROUTE 

.STK-MED ONE


   Stop: 21 11:21


   Last Admin: 21 15:24 Dose:  Not Given


   Documented by: 


Fentanyl (Fentanyl 100 Mcg/2 Ml Sdv)  100 mcg EPIDUR Q3H PRN


   PRN Reason: Pain


   Last Admin: 21 11:49 Dose:  100 mcg


   Documented by: 


Fentanyl/Bupivacaine HCl (Bupivacaine/Fentanyl/Ns 100 Ml Bag)  100 ml EPIDUR 

ASDIRECTED PRN


   PRN Reason: Pain


   Last Admin: 21 12:14 Dose:  100 ml


   Documented by: 


Lactated Ringer's (Ringers, Lactated)  1,000 mls @ 100 mls/hr IV ASDIRECTED IBRAHIMA


   Last Admin: 21 12:19 Dose:  100 mls/hr


   Documented by: 


Oxytocin/Lactated Ringer's (Pitocin In Lr 10 Units/1,000 Ml)  10 unit in 1,000 

mls @ 12 mls/hr IV TITRATE IBRAHIMA; Protocol


   Last Titration: 21 14:01 Dose:  16 munits/min, 96 mls/hr


   Documented by: 


Oxytocin/Lactated Ringer's (Pitocin In Lr 20 Units/1,000 Ml)  20 unit in 1,000 

mls @ 100 mls/hr IV .CONTINUOUS IBRAHIMA


   Last Admin: 21 18:36 Dose:  100 mls/hr


   Documented by: 


Oxytocin/Lactated Ringer's (Pitocin In Lr 10 Units/1,000 Ml) Confirm 

Administered Dose 10 unit in 1,000 mls @ as directed IV .STK-MED ONE


   Stop: 21 18:33


Misoprostol (Misoprostol 200 Mcg Tab)  1,000 mcg RECTAL ONETIME ONE


   Stop: 21 08:58


   Last Admin: 21 17:52 Dose:  1,000 mcg


   Documented by: 


Nalbuphine HCl (Nalbuphine 10 Mg/1 Ml Vial)  10 mg IVPUSH Q2H PRN


   PRN Reason: Pain


Sodium Chloride (Sodium Chloride 0.9% 10 Ml Syringe)  10 ml FLUSH ASDIRECTED PRN


   PRN Reason: Keep Vein Open











- Problem List & Annotations


(1) Chlamydia infection affecting pregnancy in first trimester


SNOMED Code(s): 17672160, 101307963


   Code(s): O98.811 - OTH MATERNAL INFEC/PARASTC DISEASES COMP PREG, FIRST TRI; 

A74.9 - CHLAMYDIAL INFECTION, UNSPECIFIED   Status: Acute   Current Visit: Yes  







(2) VIJAY II (cervical intraepithelial neoplasia II)


SNOMED Code(s): 495031399


   Code(s): N87.1 - MODERATE CERVICAL DYSPLASIA   Status: Acute   Current Visit:

Yes   





(3) VIJAY III (cervical intraepithelial neoplasia III)


Status: Acute   Current Visit: Yes   





(4) Abnormal glucose tolerance test (GTT) during pregnancy, antepartum


Status: Acute   Current Visit: Yes   





(5) History of MRSA infection


SNOMED Code(s): 631314936, 815732544


   Code(s): Z86.14 - PERSONAL HISTORY OF METHICILLIN RESIS STAPH INFECTION   

Status: Acute   Current Visit: Yes   





(6) 39 weeks gestation of pregnancy


SNOMED Code(s): 75431130


   Code(s): Z3A.39 - 39 WEEKS GESTATION OF PREGNANCY   Status: Acute   Current 

Visit: No   





- My Orders


Last 24 Hours: 


My Active Orders





21 08:54


Resuscitation Status Routine 





21 09:11


HEP C VIRUS AB [REF] Routine 


RAPID PLASMA REAGIN,RPR [CHEM] Routine 














- Assessment


Assessment:: 





I was present during the delivery procedure with the student and agree with her 

assessment and plan.  I was present for the key and critical portions of the 

delivery and assisted as needed during the delivery of the infant and placenta.








Juarez Huitron MD


6:40 PM


2021

## 2021-04-01 NOTE — PCM.PREANE
Preanesthetic Assessment





- Procedure


Proposed Procedure: 


Labor epidural








- Anesthesia/Transfusion/Family Hx


Anesthesia History: Prior Anesthesia Without Reaction


Transfusion History: No Prior Transfusion(s)


Type of Transfusion Reactions: Reports: Unknown





- Review of Systems


General: No Symptoms


Pulmonary: No Symptoms


Cardiovascular: No Symptoms


Gastrointestinal: No Symptoms


Neurological: No Symptoms


Other: Reports: Thyroid Problems (not on meds currently)





- Physical Assessment


Vital Signs: 





                                Last Vital Signs











Temp  99.2 F   21 08:54


 


Pulse  91   21 08:54


 


Resp  16   21 08:54


 


BP  118/78   21 08:54


 


Pulse Ox      











Height: 1.57 m


Weight: 84.368 kg


ASA Class: 2


Mental Status: Alert & Oriented x3


Airway Class: Mallampati = 2


Dentition: Reports: Normal Dentition


Thyro-Mental Finger Breadths: 3


Mouth Opening Finger Breadths: 3


ROM/Head Extension: Full


Lungs: Clear to Auscultation, Normal Respiratory Effort


Cardiovascular: Regular Rate, Regular Rhythm





- Lab


Values: 





                             Laboratory Last Values











POC Glucose  79 mg/dL ()   21  09:23    


 


SARS-CoV-2 RNA (CHELO)  Negative  (NEGATIVE)   21  07:21    


 


Blood Type  O POSITIVE   21  09:11    


 


Gel Antibody Screen  Negative   21  09:11    














- Allergies


Allergies/Adverse Reactions: 


                                    Allergies











Allergy/AdvReac Type Severity Reaction Status Date / Time


 


lidocaine Allergy  Rash Verified 21 09:06


 


penicillin Allergy  Hives Verified 21 09:06














- Acknowledgements


Anesthesia Type Planned: Epidural


Pt an Appropriate Candidate for the Planned Anesthesia: Yes


Alternatives and Risks of Anesthesia Discussed w Pt/Guardian: Yes


Pt/Guardian Understands and Agrees with Anesthesia Plan: Yes





PreAnesthesia Questionnaire


Cardiovascular History: Reports: Other (See Below)


Other Cardiovascular History: Tachycardia at times


OB/GYN History: Reports: Pregnancy, Spontaneous , Other (See Below)


Other OB/BYN History: D&C 2013


Endocrine/Metabolic History: Reports: Other (See Below)


Other Endocrine/Metabolic History: Graves Disease


Other Oncologic History: cancerous mole removed





- Past Surgical History


GI Surgical History: Reports: None


Female  Surgical History: Reports: None





- SUBSTANCE USE


Tobacco Use Status *Q: Never Tobacco User


Tobacco Use Within Last Twelve Months: No


Second Hand Smoke Exposure: No


Recreational Drug Use History: No





- HOME MEDS


Home Medications: 


                                    Home Meds





Propranolol [Inderal] 20 mg PO TID PRN 10/01/15 [History]


Acetaminophen [Tylenol] 650 mg PO Q6H PRN #0 tablet 10/13/16 [Rx]


Benzocaine/Menthol [Dermoplast Pain Relief Spray] 1 spray TOP ASDIRECTED PRN #0 

canister 10/13/16 [Rx]


Docusate Sodium [Colace] 100 mg PO BID PRN #0 cap 10/13/16 [Rx]


Ibuprofen [IJD: Ibuprofen] 200 - 600 mg PO Q6H PRN #0 tablet 10/13/16 [Rx]


witch hazeL [Tucks] 1 pad TOP ASDIRECTED PRN #0 pad 10/13/16 [Rx]











- CURRENT (IN HOUSE) MEDS


Current Meds: 





                               Current Medications





Lactated Ringer's (Ringers, Lactated)  1,000 mls @ 100 mls/hr IV ASDIRECTED IBRAHIMA


   Last Admin: 21 09:14 Dose:  100 mls/hr


   Documented by: 


Oxytocin/Lactated Ringer's (Pitocin In Lr 10 Units/1,000 Ml)  10 unit in 1,000 

mls @ 12 mls/hr IV TITRATE IBRAHIMA; Protocol


   Last Titration: 21 11:00 Dose:  8 munits/min, 48 mls/hr


   Documented by: 


Oxytocin/Lactated Ringer's (Pitocin In Lr 20 Units/1,000 Ml)  20 unit in 1,000 

mls @ 100 mls/hr IV .CONTINUOUS IBRAHIMA


Nalbuphine HCl (Nalbuphine 10 Mg/1 Ml Vial)  10 mg IVPUSH Q2H PRN


   PRN Reason: Pain


Sodium Chloride (Sodium Chloride 0.9% 10 Ml Syringe)  10 ml FLUSH ASDIRECTED PRN


   PRN Reason: Keep Vein Open





Discontinued Medications





Fentanyl (Fentanyl 100 Mcg/2 Ml Sdv) Confirm Administered Dose 100 mcg .ROUTE 

.STK-MED ONE


   Stop: 21 11:21


Misoprostol (Misoprostol 200 Mcg Tab)  1,000 mcg RECTAL ONETIME ONE


   Stop: 21 08:58

## 2021-04-02 RX ADMIN — VITAMIN A, ASCORBIC ACID, CHOLECALCIFEROL, .ALPHA.-TOCOPHEROL ACETATE, DL-, THIAMINE MONONITRATE, RIBOFLAVIN, NIACINAMIDE, PYRIDOXINE HYDROCHLORIDE, FOLIC ACID, CYANOCOBALAMIN, CALCIUM CARBONATE, IRON, ZINC OXIDE, AND CUPRIC OXIDE SCH: 4000; 120; 400; 22; 1.84; 3; 20; 10; 1; 12; 200; 29; 25; 2 TABLET ORAL at 17:22

## 2021-04-02 NOTE — PCM.PNPP
<Gwendolyn Luna - Last Filed: 21 11:14>





- General Info


Date of Service: 21


Admission Dx/Problem (Free Text): 


                   Patient Status Order with Admit Dx/Problem





21 08:55


Patient Status [ADT] Routine 








                           Admission Diagnosis/Problem











Admission Diagnosis/Problem    39 weeks gestation of pregnancy














Subjective Update: 





Patient is doing well. She has been up ambulating to go to the bathroom without 

much dizziness. She reports that breastfeeding is going okay. Bleeding is 

minimal per patient report 


Functional Status: Reports: Pain Controlled, Tolerating Diet, Ambulating, U

rinating





- General Info


Date of Service: 21





- Patient Data


Vital Signs - Most Recent: 


                                Last Vital Signs











Temp  98.2 F   21 08:18


 


Pulse  97   21 08:18


 


Resp  16   21 08:18


 


BP  117/71   21 08:18


 


Pulse Ox  99   21 08:18











Weight - Most Recent: 84.368 kg


I&O - Last 24 Hours: 


                                 Intake & Output











 21





 22:59 06:59 14:59


 


Intake Total 0  


 


Balance 0  











Lab Results - Last 24 Hours: 


                         Laboratory Results - last 24 hr











  21 Range/Units





  09:11 09:11 13:09 


 


WBC  8.62    (3.98-10.04)  K/mm3


 


RBC  3.77 L    (3.98-5.22)  M/mm3


 


Hgb  10.5 L    (11.2-15.7)  gm/dl


 


Hct  33.2 L    (34.1-44.9)  %


 


MCV  88.1    (79.4-94.8)  fl


 


MCH  27.9    (25.6-32.2)  pg


 


MCHC  31.6 L    (32.2-35.5)  g/dl


 


RDW Std Deviation  45.2    (36.4-46.3)  fL


 


Plt Count  252    (182-369)  K/mm3


 


MPV  10.0    (9.4-12.3)  fl


 


Neut % (Auto)  75.7 H    (34.0-71.1)  %


 


Lymph % (Auto)  15.1 L    (19.3-51.7)  %


 


Mono % (Auto)  8.2    (4.7-12.5)  %


 


Eos % (Auto)  0.2 L    (0.7-5.8)  


 


Baso % (Auto)  0.2    (0.1-1.2)  %


 


Neut # (Auto)  6.52 H    (1.56-6.13)  K/mm3


 


Lymph # (Auto)  1.30    (1.18-3.74)  K/mm3


 


Mono # (Auto)  0.71 H    (0.24-0.36)  K/mm3


 


Eos # (Auto)  0.02 L    (0.04-0.36)  K/mm3


 


Baso # (Auto)  0.02    (0.01-0.08)  K/mm3


 


POC Glucose    71  ()  mg/dL


 


RPR   Non-reactive   (NONREACTIVE)  














  21 Range/Units





  17:12 07:04 


 


WBC   11.48 H  (3.98-10.04)  K/mm3


 


RBC   2.90 L  (3.98-5.22)  M/mm3


 


Hgb   8.0 L D  (11.2-15.7)  gm/dl


 


Hct   25.7 L  (34.1-44.9)  %


 


MCV   88.6  (79.4-94.8)  fl


 


MCH   27.6  (25.6-32.2)  pg


 


MCHC   31.1 L  (32.2-35.5)  g/dl


 


RDW Std Deviation   45.1  (36.4-46.3)  fL


 


Plt Count   201  (182-369)  K/mm3


 


MPV   9.9  (9.4-12.3)  fl


 


Neut % (Auto)   79.4 H  (34.0-71.1)  %


 


Lymph % (Auto)   13.8 L  (19.3-51.7)  %


 


Mono % (Auto)   5.5  (4.7-12.5)  %


 


Eos % (Auto)   0.8  (0.7-5.8)  


 


Baso % (Auto)   0.2  (0.1-1.2)  %


 


Neut # (Auto)   9.12 H  (1.56-6.13)  K/mm3


 


Lymph # (Auto)   1.58  (1.18-3.74)  K/mm3


 


Mono # (Auto)   0.63 H  (0.24-0.36)  K/mm3


 


Eos # (Auto)   0.09  (0.04-0.36)  K/mm3


 


Baso # (Auto)   0.02  (0.01-0.08)  K/mm3


 


POC Glucose  89   ()  mg/dL


 


RPR    (NONREACTIVE)  











Med Orders - Current: 


                               Current Medications





Acetaminophen (Acetaminophen 325 Mg Tab)  650 mg PO Q6H PRN


   PRN Reason: mild pain or fever


Benzocaine/Menthol (Benzocaine/Menthol 20%-0.5% Spray 56 Gm Canister)  0 gm TOP 

ASDIRECTED PRN


   PRN Reason: Perineal Comfort Measure


   Last Admin: 21 20:07 Dose:  1 container


   Documented by: 


Docusate Sodium (Docusate Sodium 100 Mg Cap)  100 mg PO BID PRN


   PRN Reason: Constipation


Hydrocortisone Acetate (Hydrocortisone Acetate 25 Mg Supp)  25 mg RECTAL BID PRN


   PRN Reason: Hemorrhoid pain


Oxytocin/Lactated Ringer's (Pitocin In Lr 20 Units/1,000 Ml)  20 unit in 1,000 

mls @ 500 mls/hr IV SEECOMMENT [facility]


Ibuprofen (Ibuprofen 600 Mg Tab)  600 mg PO Q6H PRN


   PRN Reason: Mild pain or fever


Prenat Multivit/[place]/Iron/Folic Ac (Prenatal Multivitamin With Calcium/Folic 

Acid/Iron Tab)  1 each PO DAILY [facility]


Witch Hazel (Witch Hazel Medicated Pads 40/Jar)  1 pad TOP ASDIRECTED PRN


   PRN Reason: Perineal Comfort Measure


   Last Admin: 21 20:06 Dose:  1 container


   Documented by: 





Discontinued Medications





Diphenhydramine HCl (Diphenhydramine 50 Mg/Ml Sdv)  25 mg IVPUSH Q6H PRN


   PRN Reason: pruritis


Ephedrine Sulfate (Ephedrine 50 Mg/Ml Sdv)  5 mg IVPUSH ASDIRECTED PRN


   PRN Reason: Hypotension


   Last Admin: 21 13:03 Dose:  5 mg


   Documented by: 


Fentanyl (Fentanyl 100 Mcg/2 Ml Sdv) Confirm Administered Dose 100 mcg .ROUTE 

.STK-MED ONE


   Stop: 21 11:21


   Last Admin: 21 15:24 Dose:  Not Given


   Documented by: 


Fentanyl (Fentanyl 100 Mcg/2 Ml Sdv)  100 mcg EPIDUR Q3H PRN


   PRN Reason: Pain


   Last Admin: 21 11:49 Dose:  100 mcg


   Documented by: 


Fentanyl/Bupivacaine HCl (Bupivacaine/Fentanyl/Ns 100 Ml Bag)  100 ml EPIDUR 

ASDIRECTED PRN


   PRN Reason: Pain


   Last Admin: 21 12:14 Dose:  100 ml


   Documented by: 


Lactated Ringer's (Ringers, Lactated)  1,000 mls @ 100 mls/hr IV ASDIRECTED IBRA[facility]IMA


   Last Admin: 21 12:19 Dose:  100 mls/hr


   Documented by: 


Oxytocin/Lactated Ringer's (Pitocin In Lr 10 Units/1,000 Ml)  10 unit in 1,000 

mls @ 12 mls/hr IV TITRATE [facility]; Protocol


   Last Titration: 21 14:01 Dose:  16 munits/min, 96 mls/hr


   Documented by: 


Oxytocin/Lactated Ringer's (Pitocin In Lr 20 Units/1,000 Ml)  20 unit in 1,000 

mls @ 100 mls/hr IV .CONTINUOUS IBRA[facility]IMA


   Last Admin: 21 18:36 Dose:  100 mls/hr


   Documented by: 


Oxytocin/Lactated Ringer's (Pitocin In Lr 10 Units/1,000 Ml) Confirm 

Administered Dose 10 unit in 1,000 mls @ as directed IV .STK-MED ONE


   Stop: 21 18:33


   Last Admin: 21 19:50 Dose:  Not Given


   Documented by: 


Lidocaine/Epinephrine (Lidocaine 1.5% With Epinephrine 1:200,000 5 Ml Amp)  5 ml

 .ROUTE .STK-MED ONE


   Stop: 21 16:01


Misoprostol (Misoprostol 200 Mcg Tab)  1,000 mcg RECTAL ONETIME ONE


   Stop: 21 08:58


   Last Admin: 21 17:52 Dose:  1,000 mcg


   Documented by: 


Nalbuphine HCl (Nalbuphine 10 Mg/1 Ml Vial)  10 mg IVPUSH Q2H PRN


   PRN Reason: Pain


Sodium Chloride (Sodium Chloride 0.9% 10 Ml Syringe)  10 ml FLUSH ASDIRECTED PRN


   PRN Reason: Keep Vein Open











- Infant Interaction


Infant Disposition, Postpartum: [place] in Room with Family


Infant Feeding: Attempted Breastfeeding; Nursed Fair/Poor


Support Person: 





- Postpartum Recovery Exam


Fundal Tone: Firm


Fundal Level: At Umbilicus


Fundal Placement: Midline


Lochia Amount: Small


Lochia Color: Rubra/Red


Perineum Description: Intact, Minimal Bruising/Swelling


Episiotomy/Laceration: None


Bladder Status: Voiding





- Exam


General: Alert, Oriented


GI/Abdominal Exam: Soft, Non-Tender


Skin: Warm, Dry, Intact


Neurological: No New Focal Deficit


Psy/Mental Status: Alert, Normal Affect, Normal Mood





- Problem List & Annotations


(1) Vaginal delivery


SNOMED Code(s): 885200079


   Code(s): O80 - ENCOUNTER FOR FULL-TERM UNCOMPLICATED DELIVERY   Status: Acute

   Current Visit: Yes   





(2) 39 weeks gestation of pregnancy


SNOMED Code(s): 72950418


   Code(s): Z3A.39 - 39 WEEKS GESTATION OF PREGNANCY   Status: Acute   Current 

Visit: No   





(3) Postpartum hemorrhage


SNOMED Code(s): 25455313


   Code(s): O72.1 - OTHER IMMEDIATE POSTPARTUM HEMORRHAGE   Status: Acute   

Current Visit: Yes   





- My Orders


Last 24 Hours: 


My Active Orders





21 18:29


Acetaminophen [TylenoL]   650 mg PO Q6H PRN 


Benzocaine/Menthol [Dermoplast Pain Relief Spray]   See Dose Instructions  TOP 

ASDIRECTED PRN 


Docusate Sodium [Colace]   100 mg PO BID PRN 


Hydrocortisone Acetate [Anucort-HC]   25 mg RECTAL BID PRN 


Ibuprofen [Motrin]   600 mg PO Q6H PRN 


witch hazeL [Tucks]   1 pad TOP ASDIRECTED PRN 





21 18:29


Patient Status [ADT] Routine 


Activity as Tolerated [RC] PER UNIT ROUTINE 


May Shower [RC] ASDIRECTED 


Notify Provider Vital Signs [RC] ASDIRECTED 


Vital Signs [RC] ,,15, 


Assess Lochia [WOMSER] Per Unit Routine 


Assess Uterine Involution [WOMSER] Per Unit Routine 


Breast Pump [WOMSER] Per Unit Routine 


Ice Therapy [OM.PC] Per Unit Routine 


Medication Administration Instruction [OM.PC] Routine 


Perineal Care [OM.PC] Per Unit Routine 


Peripheral IV Discontinue [OM.PC] Routine 


Sitz Bath [OM.PC] Per Unit Routine 





21 18:30


Oxytocin/Lactated Ringers [Pitocin in LR 20 Units/1,000 ML] 20 unit in 1,000 ml 

IV SEECOMMENT 


Heat Therapy [OM.PC] PRN 





21 09:00


Prenatal Vit with Ca/FA/Iron [Prenatal Plus Iron]   1 each PO DAILY 





21 18:30


Heat Therapy [OM.PC] PRN 














- Assessment


Assessment:: 





 of a live male infant weighing 3040 g (6 lbs 11.2 ounces) at 1744 on 

21 in the [place] with APGARs of 8/9 to a 30-year-old -0-3-4 female at 39

 weeks 2 days (MARISEL 2021) by an 11-week ultrasound who presented for an 

elective induction of labor. 





- Plan


Plan:: 





Arlene Salinas is a 30-year-old -0-3-4 female at 39 weeks 2 days (MARISEL 

2021) by an 11-week ultrasound who presents for an elective induction of 

labor.





PPD#1  of a live male infant weighing 3040 g (6 lbs 11.2 ounces) at 1744 on 

21 in the [place] with APGARs of 8/9.


Rectal Cytotec 1000 micrograms placed for concerns of postpartum hemorrhage - 

initial hemoglobin 10.5, repeat hemoglobin this am 8.0


Postpartum care per unit routine 


Regular diet


Activity as tolerated


Plans to breast-feed 


Anticipate discharge in 24 hours pending pediatrician's recommendation











<Erickson West F - Last Filed: 21 01:04>





- Patient Data


Vital Signs - Most Recent: 


                                Last Vital Signs











Temp  36.7 C   21 22:08


 


Pulse  87   21 22:08


 


Resp  14   21 22:08


 


BP  131/69   21 22:08


 


Pulse Ox  96   21 22:08











I&O - Last 24 Hours: 


                                 Intake & Output











 21





 14:59 22:59 06:59


 


Intake Total  120 


 


Output Total 115  


 


Balance -115 120 











Lab Results - Last 24 Hours: 


                         Laboratory Results - last 24 hr











  21 Range/Units





  07:04 


 


WBC  11.48 H  (3.98-10.04)  K/mm3


 


RBC  2.90 L  (3.98-5.22)  M/mm3


 


Hgb  8.0 L D  (11.2-15.7)  gm/dl


 


Hct  25.7 L  (34.1-44.9)  %


 


MCV  88.6  (79.4-94.8)  fl


 


MCH  27.6  (25.6-32.2)  pg


 


MCHC  31.1 L  (32.2-35.5)  g/dl


 


RDW Std Deviation  45.1  (36.4-46.3)  fL


 


Plt Count  201  (182-369)  K/mm3


 


MPV  9.9  (9.4-12.3)  fl


 


Neut % (Auto)  79.4 H  (34.0-71.1)  %


 


Lymph % (Auto)  13.8 L  (19.3-51.7)  %


 


Mono % (Auto)  5.5  (4.7-12.5)  %


 


Eos % (Auto)  0.8  (0.7-5.8)  


 


Baso % (Auto)  0.2  (0.1-1.2)  %


 


Neut # (Auto)  9.12 H  (1.56-6.13)  K/mm3


 


Lymph # (Auto)  1.58  (1.18-3.74)  K/mm3


 


Mono # (Auto)  0.63 H  (0.24-0.36)  K/mm3


 


Eos # (Auto)  0.09  (0.04-0.36)  K/mm3


 


Baso # (Auto)  0.02  (0.01-0.08)  K/mm3











Med Orders - Current: 


                               Current Medications





Acetaminophen (Acetaminophen 325 Mg Tab)  650 mg PO Q6H PRN


   PRN Reason: mild pain or fever


Benzocaine/Menthol (Benzocaine/Menthol 20%-0.5% Spray 56 Gm Canister)  0 gm TOP 

ASDIRECTED PRN


   PRN Reason: Perineal Comfort Measure


   Last Admin: 21 20:07 Dose:  1 container


   Documented by: 


Docusate Sodium (Docusate Sodium 100 Mg Cap)  100 mg PO BID PRN


   PRN Reason: Constipation


Hydrocortisone Acetate (Hydrocortisone Acetate 25 Mg Supp)  25 mg RECTAL BID PRN


   PRN Reason: Hemorrhoid pain


Oxytocin/Lactated Ringer's (Pitocin In Lr 20 Units/1,000 Ml)  20 unit in 1,000 

mls @ 500 mls/hr IV SEECOMMENT [facility]


Ibuprofen (Ibuprofen 600 Mg Tab)  600 mg PO Q6H PRN


   PRN Reason: Mild pain or fever


Prenat Multivit/[place]/Iron/Folic Ac (Prenatal Multivitamin With Calcium/Folic 

Acid/Iron Tab)  1 each PO DAILY IB[facility]A


   Last Admin: 21 17:22 Dose:  Not Given


   Documented by: 


Witch Hazel (Witch Hazel Medicated Pads 40/Jar)  1 pad TOP ASDIRECTED PRN


   PRN Reason: Perineal Comfort Measure


   Last Admin: 21 20:06 Dose:  1 container


   Documented by: 





Discontinued Medications





Diphenhydramine HCl (Diphenhydramine 50 Mg/Ml Sdv)  25 mg IVPUSH Q6H PRN


   PRN Reason: pruritis


Ephedrine Sulfate (Ephedrine 50 Mg/Ml Sdv)  5 mg IVPUSH ASDIRECTED PRN


   PRN Reason: Hypotension


   Last Admin: 21 13:03 Dose:  5 mg


   Documented by: 


Fentanyl (Fentanyl 100 Mcg/2 Ml Sdv) Confirm Administered Dose 100 mcg .ROUTE 

.STK-MED ONE


   Stop: 21 11:21


   Last Admin: 21 15:24 Dose:  Not Given


   Documented by: 


Fentanyl (Fentanyl 100 Mcg/2 Ml Sdv)  100 mcg EPIDUR Q3H PRN


   PRN Reason: Pain


   Last Admin: 21 11:49 Dose:  100 mcg


   Documented by: 


Fentanyl/Bupivacaine HCl (Bupivacaine/Fentanyl/Ns 100 Ml Bag)  100 ml EPIDUR 

ASDIRECTED PRN


   PRN Reason: Pain


   Last Admin: 21 12:14 Dose:  100 ml


   Documented by: 


Lactated Ringer's (Ringers, Lactated)  1,000 mls @ 100 mls/hr IV ASDIRECTED IB[facility]A


   Last Admin: 21 12:19 Dose:  100 mls/hr


   Documented by: 


Oxytocin/Lactated Ringer's (Pitocin In Lr 10 Units/1,000 Ml)  10 unit in 1,000 

mls @ 12 mls/hr IV TITRATE [facility]; Protocol


   Last Titration: 21 14:01 Dose:  16 munits/min, 96 mls/hr


   Documented by: 


Oxytocin/Lactated Ringer's (Pitocin In Lr 20 Units/1,000 Ml)  20 unit in 1,000 

mls @ 100 mls/hr IV .CONTINUOUS [facility]


   Last Admin: 21 18:36 Dose:  100 mls/hr


   Documented by: 


Oxytocin/Lactated Ringer's (Pitocin In Lr 10 Units/1,000 Ml) Confirm 

Administered Dose 10 unit in 1,000 mls @ as directed IV .STK-MED ONE


   Stop: 21 18:33


   Last Admin: 21 19:50 Dose:  Not Given


   Documented by: 


Lidocaine/Epinephrine (Lidocaine 1.5% With Epinephrine 1:200,000 5 Ml Amp)  5 ml

 .ROUTE .STK-MED ONE


   Stop: 21 16:01


Misoprostol (Misoprostol 200 Mcg Tab)  1,000 mcg RECTAL ONETIME ONE


   Stop: 21 08:58


   Last Admin: 21 17:52 Dose:  1,000 mcg


   Documented by: 


Nalbuphine HCl (Nalbuphine 10 Mg/1 Ml Vial)  10 mg IVPUSH Q2H PRN


   PRN Reason: Pain


Sodium Chloride (Sodium Chloride 0.9% 10 Ml Syringe)  10 ml FLUSH ASDIRECTED PRN


   PRN Reason: Keep Vein Open











- Problem List Review


Problem List Initiated/Reviewed/Updated: Yes

## 2021-04-02 NOTE — [id number]
Post Anesthesia Note





- EVALUATION WITHIN 48HRS OF ANESTHETIC


Vital Signs in Normal Range: Yes


Patient Participated in Evaluation: Yes


Respiratory Function Stable: Yes


Airway Patent: Yes


Cardiovascular Function Stable: Yes


Hydration Status Stable: Yes


Pain Control Satisfactory: Yes


Nausea and Vomiting Control Satisfactory: Yes


Mental Status Recovered: Yes


Vital Signs: 


                                Last Vital Signs











Temp  36.8 C   04/02/21 08:18


 


Pulse  97   04/02/21 08:18


 


Resp  16   04/02/21 08:18


 


BP  117/71   04/02/21 08:18


 


Pulse Ox  99   04/02/21 08:18

## 2021-04-03 VITALS — SYSTOLIC BLOOD PRESSURE: 118 MMHG | HEART RATE: 98 BPM | DIASTOLIC BLOOD PRESSURE: 74 MMHG

## 2021-04-03 RX ADMIN — VITAMIN A, ASCORBIC ACID, CHOLECALCIFEROL, .ALPHA.-TOCOPHEROL ACETATE, DL-, THIAMINE MONONITRATE, RIBOFLAVIN, NIACINAMIDE, PYRIDOXINE HYDROCHLORIDE, FOLIC ACID, CYANOCOBALAMIN, CALCIUM CARBONATE, IRON, ZINC OXIDE, AND CUPRIC OXIDE SCH EACH: 4000; 120; 400; 22; 1.84; 3; 20; 10; 1; 12; 200; 29; 25; 2 TABLET ORAL at 10:03

## 2021-04-03 NOTE — PCM.DCSUM1
**Discharge Summary





- Hospital Course


Free Text/Narrative:: 








Stage I: Arlene Salinas is a 30-year-old -0-3-4 female at 39 weeks 2 days

(MARISEL 2021) by an 11-week ultrasound who presents for an elective induction 

of labor. Pitocin was given, an epidural was placed and AROM was performed with 

clear fluid.  The patient progressed to complete and pushing. 





Stage II:  of a live male infant weighing 3040 g (6 lbs 11.2 ounces) at 1744

on 21 in the [place] with APGARs of 8/9. No nuchal cord present. The infant 

was placed on the mother's abdomen and was warmed and stimulated. The cord was 

clamped and cut by the infant's father after a period of delayed cord clamping. 





Stage III: Placenta was delivered intact, spontaneous. Noted three vessel cord. 

The vaginal mucosa was inspected and no lacerations were seen. EBL of 1000 mL. 

Rectal cytotec 1000 micrograms was placed rectally, and Pitocin was started. 

Fundal massage demonstrated a firm uterus without active bleeding. Mother and 

infant stable to recovery.





Postpartum patient has done well Because of her blood loss hemoglobin was 

obtained on the first postpartum day and returned at 8.0. Follow-up CBC was done

on day 2 shows white count of 10.3 hemoglobin of 7.5 and platelets of 205,000. 

Patient this time is doing very well she is ambulating well. She is nursing 

without problems. She has minimal lochia and no evidence of continued bleeding. 

She is afebrile with normal vital signs. She is desiring discharge home.





Diagnosis: Stroke: No





- Discharge Data


Discharge Date: 21


Discharge Disposition: Home, Self-Care 01


Condition: Good





- Referral to Home Health


Primary Care Physician: 


Juarez Huitron MD








- Patient Instructions


Diet: Regular Diet as Tolerated (Nursing diet with increased calories and 

calcium. Increased dietary iron intake.)


Activity: As Tolerated (No intercourse or tampons until bleeding resolves.)


Driving: May Drive Today


Showering/Bathing: May Shower


Showering/Bathing, Other: Patient may take a bath


Notify Provider of: Fever, Increased Pain, Swelling and Redness, Nausea and/or 

Vomiting





- Discharge Plan


Prescriptions/Med Rec: 


Ferrous Sulfate 325 mg PO BID #100 tablet


Home Medications: 


                                    Home Meds





Acetaminophen [Tylenol] 650 mg PO Q6H PRN  tablet 21 [Rx]


Ferrous Sulfate 325 mg PO BID #100 tablet 21 [Rx]


Ibuprofen [Motrin] 600 mg PO Q6H PRN  tablet 21 [Rx]


Prenatal Vit with Ca/FA/Iron [Prenatal Plus Iron] 1 each PO DAILY  tablet 

21 [Rx]








Referrals: 


Juarez Huitron MD [Primary Care Provider] -  (Return to [facility] weeks.)





- Discharge Summary/Plan Comment


DC Time >30 min.: No


Discharge Summary/Plan Comment: 








Discharge instructions:





1. Discharge home





2. Diet, activity and follow-up discussed with patient. Recommend nursing diet 

with increased calories and calcium.





3. Precautions given concern increased pain, bleeding, temperature, 

signs/symptoms of DVT/PE.





4. Medications per home medication was printed, discussed with and given to the 

patient.





5. Return to clinic-Dr. Huitron[facility] in 2 weeks.





Diagnosis: 





1. Term pregnancy-delivered 





2. Anemia secondary to pregnancy and blood loss at time of delivery.





Condition: Good





- Patient Data


Vitals - Most Recent: 


                                Last Vital Signs











Temp  36.7 C   21 04:35


 


Pulse  86   21 04:35


 


Resp  12   21 04:35


 


BP  110/75   21 04:35


 


Pulse Ox  97   21 04:35











Weight - Most Recent: 84.368 kg


I&O - Last 24 hours: 


                                 Intake & Output











 21





 22:59 06:59 14:59


 


Intake Total 120  


 


Balance 120  











Lab Results - Last 24 hrs: 


                         Laboratory Results - last 24 hr











  21 Range/Units





  09:11 06:00 


 


WBC   10.30 H  (3.98-10.04)  K/mm3


 


RBC   2.72 L  (3.98-5.22)  M/mm3


 


Hgb   7.5 L  (11.2-15.7)  gm/dl


 


Hct   24.4 L  (34.1-44.9)  %


 


MCV   89.7  (79.4-94.8)  fl


 


MCH   27.6  (25.6-32.2)  pg


 


MCHC   30.7 L  (32.2-35.5)  g/dl


 


RDW Std Deviation   45.8  (36.4-46.3)  fL


 


Plt Count   205  (182-369)  K/mm3


 


MPV   9.8  (9.4-12.3)  fl


 


Hepatitis C Antibody  <0.1   (0.0-0.9)  s/co ratio











Med Orders - Current: 


                               Current Medications





Acetaminophen (Acetaminophen 325 Mg Tab)  650 mg PO Q6H PRN


   PRN Reason: mild pain or fever


Benzocaine/Menthol (Benzocaine/Menthol 20%-0.5% Spray 56 Gm Canister)  0 gm TOP 

ASDIRECTED PRN


   PRN Reason: Perineal Comfort Measure


   Last Admin: 21 20:07 Dose:  1 container


   Documented by: 


Docusate Sodium (Docusate Sodium 100 Mg Cap)  100 mg PO BID PRN


   PRN Reason: Constipation


Hydrocortisone Acetate (Hydrocortisone Acetate 25 Mg Supp)  25 mg RECTAL BID PRN


   PRN Reason: Hemorrhoid pain


Oxytocin/Lactated Ringer's (Pitocin In Lr 20 Units/1,000 Ml)  20 unit in 1,000 

mls @ 500 mls/hr IV SEECOMMENT [facility]


Ibuprofen (Ibuprofen 600 Mg Tab)  600 mg PO Q6H PRN


   PRN Reason: Mild pain or fever


Prenat Multivit//Iron/Folic Ac (Prenatal Multivitamin With Calcium/Folic 

Acid/Iron Tab)  1 each PO DAILY [facility]


   Last Admin: 21 17:22 Dose:  Not Given


   Documented by: 


Witch Hazel (Witch Hazel Medicated Pads 40/Jar)  1 pad TOP ASDIRECTED PRN


   PRN Reason: Perineal Comfort Measure


   Last Admin: 21 20:06 Dose:  1 container


   Documented by: 





Discontinued Medications





Diphenhydramine HCl (Diphenhydramine 50 Mg/Ml Sdv)  25 mg IVPUSH Q6H PRN


   PRN Reason: pruritis


Ephedrine Sulfate (Ephedrine 50 Mg/Ml Sdv)  5 mg IVPUSH ASDIRECTED PRN


   PRN Reason: Hypotension


   Last Admin: 21 13:03 Dose:  5 mg


   Documented by: 


Fentanyl (Fentanyl 100 Mcg/2 Ml Sdv) Confirm Administered Dose 100 mcg .ROUTE 

.STK-MED ONE


   Stop: 21 11:21


   Last Admin: 21 15:24 Dose:  Not Given


   Documented by: 


Fentanyl (Fentanyl 100 Mcg/2 Ml Sdv)  100 mcg EPIDUR Q3H PRN


   PRN Reason: Pain


   Last Admin: 21 11:49 Dose:  100 mcg


   Documented by: 


Fentanyl/Bupivacaine HCl (Bupivacaine/Fentanyl/Ns 100 Ml Bag)  100 ml EPIDUR 

ASDIRECTED PRN


   PRN Reason: Pain


   Last Admin: 21 12:14 Dose:  100 ml


   Documented by: 


Lactated Ringer's (Ringers, Lactated)  1,000 mls @ 100 mls/hr IV ASDIRECTED IBRAHIMA


   Last Admin: 21 12:19 Dose:  100 mls/hr


   Documented by: 


Oxytocin/Lactated Ringer's (Pitocin In Lr 10 Units/1,000 Ml)  10 unit in 1,000 

mls @ 12 mls/hr IV TITRATE IBRAHIMA; Protocol


   Last Titration: 21 14:01 Dose:  16 munits/min, 96 mls/hr


   Documented by: 


Oxytocin/Lactated Ringer's (Pitocin In Lr 20 Units/1,000 Ml)  20 unit in 1,000 

mls @ 100 mls/hr IV .CONTINUOUS IBRAHIMA


   Last Admin: 21 18:36 Dose:  100 mls/hr


   Documented by: 


Oxytocin/Lactated Ringer's (Pitocin In Lr 10 Units/1,000 Ml) Confirm 

Administered Dose 10 unit in 1,000 mls @ as directed IV .STK-MED ONE


   Stop: 21 18:33


   Last Admin: 21 19:50 Dose:  Not Given


   Documented by: 


Lidocaine/Epinephrine (Lidocaine 1.5% With Epinephrine 1:200,000 5 Ml Amp)  5 ml

.ROUTE .STK-MED ONE


   Stop: 21 16:01


Misoprostol (Misoprostol 200 Mcg Tab)  1,000 mcg RECTAL ONETIME ONE


   Stop: 21 08:58


   Last Admin: 21 17:52 Dose:  1,000 mcg


   Documented by: 


Nalbuphine HCl (Nalbuphine 10 Mg/1 Ml Vial)  10 mg IVPUSH Q2H PRN


   PRN Reason: Pain


Sodium Chloride (Sodium Chloride 0.9% 10 Ml Syringe)  10 ml FLUSH ASDIRECTED PRN


   PRN Reason: Keep Vein Open